# Patient Record
(demographics unavailable — no encounter records)

---

## 2024-10-11 NOTE — DISCUSSION/SUMMARY
[de-identified] : 5 more days abx.  follow up 7 days.  go to er for fevers or chills.  reaspiration today

## 2024-10-11 NOTE — ASSESSMENT
[FreeTextEntry1] : acute left elbow swelling since 9/26/24.  no injury.  no fevers or chills.  bursal swelling present. pansensitive staph present.  improved overall. 98% improved.   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

## 2024-10-11 NOTE — PROCEDURE
[Left] : of the left [Pain] : pain [Inflammation] : inflammation [Alcohol] : alcohol [Betadine] : betadine [] : Patient tolerated procedure well [Call if redness, pain or fever occur] : call if redness, pain or fever occur [Apply ice for 15min out of every hour for the next 12-24 hours as tolerated] : apply ice for 15 minutes out of every hour for the next 12-24 hours as tolerated [Patient was advised to rest the joint(s) for ____ days] : patient was advised to rest the joint(s) for [unfilled] days [Previous OTC use and PT nontherapeutic] : patient has tried OTC's including aspirin, Ibuprofen, Aleve, etc or prescription NSAIDS, and/or exercises at home and/or physical therapy without satisfactory response [Patient had decreased mobility in the joint] : patient had decreased mobility in the joint [Risks, benefits, alternatives discussed / Verbal consent obtained] : the risks benefits, and alternatives have been discussed, and verbal consent was obtained [de-identified] : 1-2 [de-identified] : less purulent

## 2024-10-11 NOTE — PHYSICAL EXAM
[Left] : left elbow [4___] : extension 4[unfilled]/5 [5___] : supination 5[unfilled]/5 [] : light touch intact [FreeTextEntry3] : olecranon bursal swelling.   decreased swelling arm   less swelling. [FreeTextEntry9] : 0-100 [TWNoteComboBox7] : flexion 110 degrees [TWNoteComboBox4] : extension 0 degrees [de-identified] : supination 70 degrees

## 2024-10-11 NOTE — HISTORY OF PRESENT ILLNESS
[4] : 4 [Shooting] : shooting [] : yes [de-identified] : 09/27/2024: 43-year-old female presenting with LT elbow pain. No known injury/fall. Experiencing swelling and tight/throbbing pain since last night (7pm). Warmth to touch. Went to [facility]- was told it is most likely bursitis and referred to orthopedic. Prescribed Meloxicam - has taken 1 with some relief. Applied ace bandage with no relief.   Occupation:    10/01/2024 - follow up for left elbow pain, pt states it improved mildly, she's carry her arm and it is creating a shoulder pain. 10/4/24 - pt is here left elbow pain, pt states doing way better, slow improvement. some rom issues. She is taking ABX. 10/11/24 pt here for f/u left elbow, pt states she feels better 98%.  no fevers or chills.  abx is going to be done tomorrow.  [FreeTextEntry1] : LT elbow

## 2024-10-18 NOTE — ASSESSMENT
[FreeTextEntry1] : acute left elbow swelling since 9/26/24.  no injury.  no fevers or chills.  bursal swelling present. pansensitive staph present.  small amount of fluid left.  better but still having pain. no fluid today but still some boggyness in bursa.  likely just inflammation.  off abx and no rebound.  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

## 2024-10-18 NOTE — DISCUSSION/SUMMARY
[de-identified] : The patient's orthopaedic condition warrants consideration of intermittent use of over-the-counter medications such as advil, alleve, tylenol and similar agents.  The patient is aware to use the medications only as directed on the bottle and should contact a physician should they encounter any problems.  Call if no resolution in next week.  Call or go to ER for increased pain, fevers or chills for possible iv abx and or ye.

## 2024-10-18 NOTE — PHYSICAL EXAM
[Left] : left elbow [] : light touch intact [5___] : extension 5[unfilled]/5 [FreeTextEntry3] : minimal bursal swelling. [FreeTextEntry9] : 0-100 [TWNoteComboBox7] : flexion 140 degrees [TWNoteComboBox4] : extension 0 degrees [TWNoteComboBox6] : pronation 70 degrees [de-identified] : supination 70 degrees

## 2024-10-18 NOTE — HISTORY OF PRESENT ILLNESS
[de-identified] : 09/27/2024: 43-year-old female presenting with LT elbow pain. No known injury/fall. Experiencing swelling and tight/throbbing pain since last night (7pm). Warmth to touch. Went to [facility]- was told it is most likely bursitis and referred to orthopedic. Prescribed Meloxicam - has taken 1 with some relief. Applied ace bandage with no relief.  Occupation:   10/01/2024 - follow up for left elbow pain, pt states it improved mildly, she's carry her arm and it is creating a shoulder pain. 10/4/24 - pt is here left elbow pain, pt states doing way better, slow improvement. some rom issues. She is taking ABX. 10/11/24 pt here for f/u left elbow, pt states she feels better 98%.  no fevers or chills.  abx is going to be done tomorrow.  10/18/24:  follow up left elbow. minimal pain